# Patient Record
Sex: MALE | URBAN - METROPOLITAN AREA
[De-identification: names, ages, dates, MRNs, and addresses within clinical notes are randomized per-mention and may not be internally consistent; named-entity substitution may affect disease eponyms.]

---

## 2017-08-15 ENCOUNTER — EMERGENCY (EMERGENCY)
Facility: HOSPITAL | Age: 32
LOS: 1 days | Discharge: HOME | End: 2017-08-15
Attending: EMERGENCY MEDICINE | Admitting: EMERGENCY MEDICINE
Payer: COMMERCIAL

## 2017-08-15 VITALS
SYSTOLIC BLOOD PRESSURE: 130 MMHG | DIASTOLIC BLOOD PRESSURE: 69 MMHG | OXYGEN SATURATION: 100 % | TEMPERATURE: 98 F | HEART RATE: 63 BPM | RESPIRATION RATE: 17 BRPM

## 2017-08-15 VITALS
OXYGEN SATURATION: 100 % | HEART RATE: 83 BPM | RESPIRATION RATE: 18 BRPM | TEMPERATURE: 98 F | SYSTOLIC BLOOD PRESSURE: 102 MMHG | DIASTOLIC BLOOD PRESSURE: 80 MMHG

## 2017-08-15 PROCEDURE — 71020: CPT | Mod: 26

## 2017-08-15 PROCEDURE — 99284 EMERGENCY DEPT VISIT MOD MDM: CPT

## 2017-08-15 RX ORDER — KETOROLAC TROMETHAMINE 30 MG/ML
30 SYRINGE (ML) INJECTION ONCE
Qty: 0 | Refills: 0 | Status: DISCONTINUED | OUTPATIENT
Start: 2017-08-15 | End: 2017-08-15

## 2017-08-15 RX ADMIN — Medication 30 MILLIGRAM(S): at 15:48

## 2017-08-15 NOTE — ED PROVIDER NOTE - OBJECTIVE STATEMENT
31 y/o M w/ no significant PMHx, presents to the ED c/o HA and facial swelling x3 weeks. Pt states pain has now radiated to left shoulder since today. Pt was seen by PCP, dx w/ TMJ and told to follow up w/ Neuro. Pt has appt w/ neurologist, but it is at the end of the month. Pt has taken Excedrin w/ no relief. Denies fever, chills, rhinorrhea, cough, nausea, vomiting, diarrhea, dysuria or any other complaints. NKDA.

## 2017-08-15 NOTE — ED PROVIDER NOTE - MEDICAL DECISION MAKING DETAILS
33 y/o M w/ multiple medical complaints including HA, muscle aches, TMJ pain, tension HA w/ likely TMJ syndrome consider partitis although no swelling, discharge or erythema noted, advise NSAIDs, sour candies, close dental follow up as previously scheduled. Will obtain XR to eval for bony abnormalities, Toradol shot and anticipate dc home.

## 2017-08-15 NOTE — ED PROVIDER NOTE - ATTENDING CONTRIBUTION TO CARE
I performed the initial face to face bedside interview with this patient regarding history of present illness, review of symptoms and past medical, social and family history.  I completed an independent physical examination.  I was the initial provider who evaluated this patient.  The history, review of symptoms and examination was documented by the scribe in my presence and I attest to the accuracy of the documentation.  I have signed out the follow up of any pending tests (i.e. labs, radiological studies) to the resident.  I have discussed the patient’s plan of care and disposition with the resident.

## 2017-08-15 NOTE — ED PROVIDER NOTE - CARE PLAN
Principal Discharge DX:	TMJ (temporomandibular joint syndrome)  Secondary Diagnosis:	Tension headache

## 2017-08-15 NOTE — ED ADULT TRIAGE NOTE - CHIEF COMPLAINT QUOTE
pt c/o headache and facial swelling since yesterday.  pt taking excedrin for pain and it is not helping

## 2017-11-26 ENCOUNTER — EMERGENCY (EMERGENCY)
Facility: HOSPITAL | Age: 32
LOS: 1 days | Discharge: HOME | End: 2017-11-26
Attending: EMERGENCY MEDICINE | Admitting: EMERGENCY MEDICINE
Payer: COMMERCIAL

## 2017-11-26 VITALS
OXYGEN SATURATION: 98 % | HEIGHT: 71 IN | SYSTOLIC BLOOD PRESSURE: 126 MMHG | HEART RATE: 78 BPM | DIASTOLIC BLOOD PRESSURE: 78 MMHG | RESPIRATION RATE: 16 BRPM | TEMPERATURE: 98 F | WEIGHT: 210.1 LBS

## 2017-11-26 VITALS
RESPIRATION RATE: 17 BRPM | DIASTOLIC BLOOD PRESSURE: 87 MMHG | HEART RATE: 75 BPM | SYSTOLIC BLOOD PRESSURE: 124 MMHG | OXYGEN SATURATION: 98 % | TEMPERATURE: 98 F

## 2017-11-26 PROCEDURE — 99284 EMERGENCY DEPT VISIT MOD MDM: CPT | Mod: 25

## 2017-11-26 PROCEDURE — 99283 EMERGENCY DEPT VISIT LOW MDM: CPT | Mod: 25

## 2017-11-26 PROCEDURE — 93010 ELECTROCARDIOGRAM REPORT: CPT

## 2017-11-26 PROCEDURE — 93005 ELECTROCARDIOGRAM TRACING: CPT

## 2017-11-26 RX ORDER — IBUPROFEN 200 MG
600 TABLET ORAL ONCE
Qty: 0 | Refills: 0 | Status: COMPLETED | OUTPATIENT
Start: 2017-11-26 | End: 2017-11-26

## 2017-11-26 RX ADMIN — Medication 600 MILLIGRAM(S): at 15:27

## 2017-11-26 NOTE — ED PROVIDER NOTE - MEDICAL DECISION MAKING DETAILS
33yo male with chest tightness, normal EKG, no risk factors for PE, offered CXR but declined, will give motrin, refer to PMD for follow up. Nafisa Mann DO

## 2017-11-26 NOTE — ED ADULT NURSE NOTE - CHIEF COMPLAINT QUOTE
"chest pain, body aches, spasms for the past 3 months", shooting feeling down his arms, no numbness or tingling

## 2017-11-26 NOTE — ED PROVIDER NOTE - INTERPRETATION
normal sinus rhythm, Normal axis, Normal OH interval and QRS complex. There are no acute ischemic ST or T-wave changes. normal sinus rhythm

## 2017-11-26 NOTE — ED ADULT NURSE NOTE - OBJECTIVE STATEMENT
31 yo male presents to the ED c/o left sided chest pain, dull in nature, intermittent, non-radiating since 1100. patient states he woke up with the chest pain this AM. patient also c/o upper bilateral abdominal pain, sharp in nature. abdomen is soft, tender to touch upper quadrants bilaterally. patient states last BM was yesterday, constipated as per patient. patient also c/o muscle weakness and muscle spasms in neck, back, arms and legs bilaterally since august, worst over the last week. patient is AAOx3. lung sounds clear bilaterally. cap refill <3 sec. patient states he is to follow up with neurology in 3 weeks but patient states pain is getting worse. patient denies SOB, fevers, chills, N/V/D, HA, dizziness, cough, recent travel. JENNIE MURGUIA notified. NSR.

## 2017-11-26 NOTE — ED PROVIDER NOTE - PLAN OF CARE
1. Follow up with your primary care physician within 2-3days for reevaluation.  2.  Return to the Emergency Department for worsening, progressive or any other concerning symptoms.  3.  Please take Motrin 600mg by mouth every 6 hours as needed for pain. Please take this medication with food.

## 2017-11-26 NOTE — ED ADULT TRIAGE NOTE - CHIEF COMPLAINT QUOTE
"chest pain, body aches, spasms for the past 3 months", shooting feeling down his arms, no numbness or tingling "chest pain, body aches, spasms for the past 3 MONTHS", shooting feeling down his arms intermittent, no numbness or tingling, no radiation of pain

## 2017-11-26 NOTE — ED PROVIDER NOTE - PSH
No significant past surgical history No significant past surgical history    No significant past surgical history

## 2017-11-26 NOTE — ED PROVIDER NOTE - CARE PLAN
Principal Discharge DX:	Chest discomfort  Instructions for follow-up, activity and diet:	1. Follow up with your primary care physician within 2-3days for reevaluation.  2.  Return to the Emergency Department for worsening, progressive or any other concerning symptoms.  3.  Please take Motrin 600mg by mouth every 6 hours as needed for pain. Please take this medication with food.

## 2017-11-26 NOTE — ED PROVIDER NOTE - OBJECTIVE STATEMENT
31yo male PMH anxiety presenting with chest tightness x several months, no shortness of breath, seen by PMD several months ago and told he had high cholesterol but not given any medications. No fevers or chills, +dry cough x several months with negative CXR. No nausea or vomiting, no weakness. Patient states he has been treated for anxiety but recently discontinued his medication several days ago. Last used cocaine 4 months ago. +h/o herniated discs in neck and lumbar spine. No numbness or tingling, no weakness.     PMD: Dr. Goldberg

## 2017-11-26 NOTE — ED PROVIDER NOTE - PHYSICAL EXAMINATION
Gen: NAD, AOx3  Head: NCAT  Lung: CTAB, no respiratory distress, no wheezing, rales, rhonchi  CV: +TTP chest wall left sternal border, normal s1/s2, rrr, no murmurs, Normal perfusion, pulses 2+ throughout  Abd: soft, NTND, no CVA tenderness  MSK: No edema, no visible deformities, full range of motion in all 4 extremities  Neuro: CN II-XII grossly intact, No focal neurologic deficits  Skin: No rash   Psych: normal affect

## 2017-11-26 NOTE — ED PROVIDER NOTE - ATTENDING CONTRIBUTION TO CARE
I have seen and evaluated this patient with the resident.   I agree with the findings  unless other wise stated.  I have made appropriate changes in documentations where needed, After my face to face bedside evaluation, I am further  notinyo male with chest tightness, Intermmittent going on for months has seen doctors for same reason evaluated and told to have normal findings also h/o anxiety No cardiac or PE risk factors normal EKG, no risk factors for PE, offered CXR but declined, will give motrin,will be followed with PMD  --Acevedo

## 2018-04-02 PROBLEM — Z00.00 ENCOUNTER FOR PREVENTIVE HEALTH EXAMINATION: Status: ACTIVE | Noted: 2018-04-02

## 2018-05-01 ENCOUNTER — APPOINTMENT (OUTPATIENT)
Dept: NEUROLOGY | Facility: CLINIC | Age: 33
End: 2018-05-01
Payer: MEDICAID

## 2018-05-01 ENCOUNTER — TRANSCRIPTION ENCOUNTER (OUTPATIENT)
Age: 33
End: 2018-05-01

## 2018-05-01 VITALS
BODY MASS INDEX: 38.22 KG/M2 | DIASTOLIC BLOOD PRESSURE: 86 MMHG | WEIGHT: 267 LBS | HEART RATE: 76 BPM | OXYGEN SATURATION: 98 % | HEIGHT: 70 IN | SYSTOLIC BLOOD PRESSURE: 132 MMHG

## 2018-05-01 DIAGNOSIS — G72.9 MYOPATHY, UNSPECIFIED: ICD-10-CM

## 2018-05-01 DIAGNOSIS — Z83.3 FAMILY HISTORY OF DIABETES MELLITUS: ICD-10-CM

## 2018-05-01 DIAGNOSIS — Z86.39 PERSONAL HISTORY OF OTHER ENDOCRINE, NUTRITIONAL AND METABOLIC DISEASE: ICD-10-CM

## 2018-05-01 DIAGNOSIS — Z86.19 PERSONAL HISTORY OF OTHER INFECTIOUS AND PARASITIC DISEASES: ICD-10-CM

## 2018-05-01 DIAGNOSIS — F17.210 NICOTINE DEPENDENCE, CIGARETTES, UNCOMPLICATED: ICD-10-CM

## 2018-05-01 DIAGNOSIS — Z78.9 OTHER SPECIFIED HEALTH STATUS: ICD-10-CM

## 2018-05-01 PROCEDURE — 99205 OFFICE O/P NEW HI 60 MIN: CPT

## 2018-05-07 LAB — CK SERPL-CCNC: 389 U/L

## 2018-07-11 ENCOUNTER — APPOINTMENT (OUTPATIENT)
Dept: NEUROLOGY | Facility: CLINIC | Age: 33
End: 2018-07-11

## 2018-08-15 ENCOUNTER — APPOINTMENT (OUTPATIENT)
Dept: NEUROLOGY | Facility: CLINIC | Age: 33
End: 2018-08-15
Payer: MEDICAID

## 2018-08-15 VITALS
WEIGHT: 268 LBS | DIASTOLIC BLOOD PRESSURE: 88 MMHG | OXYGEN SATURATION: 98 % | HEIGHT: 70 IN | HEART RATE: 94 BPM | BODY MASS INDEX: 38.37 KG/M2 | SYSTOLIC BLOOD PRESSURE: 124 MMHG | TEMPERATURE: 97.7 F

## 2018-08-15 DIAGNOSIS — F17.200 NICOTINE DEPENDENCE, UNSPECIFIED, UNCOMPLICATED: ICD-10-CM

## 2018-08-15 DIAGNOSIS — Z82.0 FAMILY HISTORY OF EPILEPSY AND OTHER DISEASES OF THE NERVOUS SYSTEM: ICD-10-CM

## 2018-08-15 DIAGNOSIS — M62.81 MUSCLE WEAKNESS (GENERALIZED): ICD-10-CM

## 2018-08-15 PROBLEM — F41.9 ANXIETY DISORDER, UNSPECIFIED: Chronic | Status: ACTIVE | Noted: 2017-11-26

## 2018-08-15 PROCEDURE — 99215 OFFICE O/P EST HI 40 MIN: CPT

## 2018-08-15 RX ORDER — ERGOCALCIFEROL 1.25 MG/1
1.25 MG CAPSULE, LIQUID FILLED ORAL
Qty: 4 | Refills: 0 | Status: ACTIVE | COMMUNITY
Start: 2018-01-11

## 2018-09-08 ENCOUNTER — APPOINTMENT (OUTPATIENT)
Dept: MRI IMAGING | Facility: HOSPITAL | Age: 33
End: 2018-09-08
Payer: MEDICAID

## 2018-09-08 ENCOUNTER — OUTPATIENT (OUTPATIENT)
Dept: OUTPATIENT SERVICES | Facility: HOSPITAL | Age: 33
LOS: 1 days | End: 2018-09-08
Payer: COMMERCIAL

## 2018-09-08 DIAGNOSIS — M62.81 MUSCLE WEAKNESS (GENERALIZED): ICD-10-CM

## 2018-09-08 PROCEDURE — 70551 MRI BRAIN STEM W/O DYE: CPT | Mod: 26

## 2018-09-08 PROCEDURE — 72148 MRI LUMBAR SPINE W/O DYE: CPT | Mod: 26

## 2018-09-08 PROCEDURE — 72141 MRI NECK SPINE W/O DYE: CPT | Mod: 26

## 2018-09-08 PROCEDURE — 70551 MRI BRAIN STEM W/O DYE: CPT

## 2018-09-08 PROCEDURE — 72141 MRI NECK SPINE W/O DYE: CPT

## 2018-09-08 PROCEDURE — 72148 MRI LUMBAR SPINE W/O DYE: CPT

## 2018-11-28 ENCOUNTER — APPOINTMENT (OUTPATIENT)
Dept: NEUROLOGY | Facility: CLINIC | Age: 33
End: 2018-11-28
Payer: MEDICAID

## 2018-11-28 VITALS
DIASTOLIC BLOOD PRESSURE: 82 MMHG | WEIGHT: 278 LBS | SYSTOLIC BLOOD PRESSURE: 126 MMHG | TEMPERATURE: 98.1 F | OXYGEN SATURATION: 97 % | HEIGHT: 70 IN | HEART RATE: 97 BPM | BODY MASS INDEX: 39.8 KG/M2

## 2018-11-28 PROCEDURE — 95886 MUSC TEST DONE W/N TEST COMP: CPT | Mod: 50

## 2018-11-28 PROCEDURE — 95911 NRV CNDJ TEST 9-10 STUDIES: CPT

## 2018-11-30 ENCOUNTER — APPOINTMENT (OUTPATIENT)
Dept: NEUROLOGY | Facility: CLINIC | Age: 33
End: 2018-11-30
Payer: MEDICAID

## 2018-11-30 VITALS
OXYGEN SATURATION: 98 % | BODY MASS INDEX: 39.8 KG/M2 | HEART RATE: 85 BPM | SYSTOLIC BLOOD PRESSURE: 120 MMHG | TEMPERATURE: 98.1 F | HEIGHT: 70 IN | DIASTOLIC BLOOD PRESSURE: 82 MMHG | WEIGHT: 278 LBS

## 2018-11-30 PROCEDURE — 95886 MUSC TEST DONE W/N TEST COMP: CPT

## 2018-11-30 PROCEDURE — 95913 NRV CNDJ TEST 13/> STUDIES: CPT

## 2019-01-14 ENCOUNTER — APPOINTMENT (OUTPATIENT)
Dept: NEUROLOGY | Facility: CLINIC | Age: 34
End: 2019-01-14
Payer: MEDICAID

## 2019-01-14 VITALS
HEART RATE: 84 BPM | DIASTOLIC BLOOD PRESSURE: 71 MMHG | HEIGHT: 70 IN | OXYGEN SATURATION: 96 % | TEMPERATURE: 97.6 F | BODY MASS INDEX: 39.65 KG/M2 | SYSTOLIC BLOOD PRESSURE: 132 MMHG | WEIGHT: 277 LBS

## 2019-01-14 DIAGNOSIS — R22.0 LOCALIZED SWELLING, MASS AND LUMP, HEAD: ICD-10-CM

## 2019-01-14 DIAGNOSIS — M19.90 UNSPECIFIED OSTEOARTHRITIS, UNSPECIFIED SITE: ICD-10-CM

## 2019-01-14 DIAGNOSIS — M54.16 RADICULOPATHY, LUMBAR REGION: ICD-10-CM

## 2019-01-14 PROCEDURE — 99214 OFFICE O/P EST MOD 30 MIN: CPT

## 2019-01-14 RX ORDER — TIZANIDINE 4 MG/1
4 TABLET ORAL
Qty: 90 | Refills: 0 | Status: DISCONTINUED | COMMUNITY
Start: 2018-02-13 | End: 2019-01-14

## 2019-01-14 RX ORDER — GABAPENTIN 300 MG/1
300 CAPSULE ORAL
Qty: 90 | Refills: 2 | Status: ACTIVE | COMMUNITY
Start: 2019-01-14 | End: 1900-01-01

## 2019-01-14 NOTE — ASSESSMENT
[FreeTextEntry1] : The patient's work up is unremarkable for ALS, the patient has family history and has been advised to fu if there is new weakness or progression of symptoms.  Will continue to monitor him.  There are no multiple pain of muscle tenderness on exam, does not meet criteria for fibromyalgia.  No signs of myopathy of emg.\par \par The patient has back pain radiating to the legs with sensory loss to right L4, S1 distribution and nerve conduction/ EMG of the lack shows chronic L4-L5 radiculopathy bilaterally as well as imaging which shows small central and right-sided disc herniation and L3-L4 and L4-L5. As well a small central disc herniation at L5-S1.  Will recommend that the patient try PT again.  Neurontin for pain will titrate to 300mg tid.  \par \par Neck pain and diffuse joint pain\par will refer to rheum for eval of arthritis\par \par Insomnia\par will refer for sleep medicine

## 2019-01-14 NOTE — DATA REVIEWED
[de-identified] : (images reviwed): unremarkable brain [de-identified] : Nerve conduction/EMG of the arms is normal\par Nerve conduction/ EMG of the lack shows chronic L4-L5 radiculopathy bilaterally\par \par MRI C spine: \par IMPRESSION: \par Mild degenerative changes, mostly uncovertebral arthropathy. No significant \par central canal stenosis or neuroforaminal narrowing in the cervical spine. \par \par No cervical cord signal abnormality. \par MRI of the lumbar spine shows small central and right-sided disc herniation and L3-L4 and L4-L5. As well a small central disc herniation at L5-S1.

## 2019-01-14 NOTE — PHYSICAL EXAM
[General Appearance - Alert] : alert [General Appearance - In No Acute Distress] : in no acute distress [Person] : oriented to person [Place] : oriented to place [Time] : oriented to time [Registration Intact] : recent registration memory intact [Concentration Intact] : normal concentrating ability [Visual Intact] : visual attention was ~T not ~L decreased [Naming Objects] : no difficulty naming common objects [Repeating Phrases] : no difficulty repeating a phrase [Fluency] : fluency intact [Comprehension] : comprehension intact [Vocabulary] : adequate range of vocabulary [Cranial Nerves Optic (II)] : visual acuity intact bilaterally,  visual fields full to confrontation, pupils equal round and reactive to light [Cranial Nerves Oculomotor (III)] : extraocular motion intact [Cranial Nerves Trigeminal (V)] : facial sensation intact symmetrically [Cranial Nerves Facial (VII)] : face symmetrical [Motor Tone] : muscle tone was normal in all four extremities [Motor Strength] : muscle strength was normal in all four extremities [Abnormal Walk] : normal gait [Balance] : balance was intact [FreeTextEntry5] : facial mass on left maxilla [FreeTextEntry7] : sensory loss to right L4, S1 distribution

## 2019-01-14 NOTE — HISTORY OF PRESENT ILLNESS
[FreeTextEntry1] : The patient is a 33-year-old gentleman who is here for pain and weakness in bilateral hands, neck and back. Symptoms started about one year ago. He feels weak in the hands arms and legs, especially with fine motor skills. He also neck and back pain. He was previously diagnosed by cervical radiculopathy. He was given Tizanidine without relief of the pain, it was stopped recently. He notes twitching in his muscles for the past few months to year. He also feels stiffness in the arms and the legs. He feels that his symptoms have progressed over the past year. She denies difficulty with speaking, swallowing or breathing. There is no numbness or tingling in the arms or the legs. \par \par The patient has had elevated CPK. He does not exercise. He has gained significant weight over the past one year, he stopped working.  There is family history of ALS in patient's maternal uncle, who was diagnosed around age 40. \par \par He also feels stiffening of his face and was noted to have left sided facial mass, he was seen by head and neck surgeon.  The patient also has diffuse joint pain as well as difficulty with sleep.\par

## 2019-01-14 NOTE — REVIEW OF SYSTEMS
[As Noted in HPI] : as noted in HPI [Joint Pain] : joint pain [Muscle Weakness] : no muscle weakness

## 2019-03-19 ENCOUNTER — APPOINTMENT (OUTPATIENT)
Dept: RHEUMATOLOGY | Facility: CLINIC | Age: 34
End: 2019-03-19

## 2019-05-02 ENCOUNTER — TRANSCRIPTION ENCOUNTER (OUTPATIENT)
Age: 34
End: 2019-05-02

## 2019-05-02 ENCOUNTER — APPOINTMENT (OUTPATIENT)
Dept: NEUROLOGY | Facility: CLINIC | Age: 34
End: 2019-05-02
Payer: MEDICAID

## 2019-05-02 VITALS
OXYGEN SATURATION: 97 % | DIASTOLIC BLOOD PRESSURE: 83 MMHG | HEART RATE: 80 BPM | HEIGHT: 70 IN | SYSTOLIC BLOOD PRESSURE: 125 MMHG | BODY MASS INDEX: 38.65 KG/M2 | WEIGHT: 270 LBS

## 2019-05-02 DIAGNOSIS — G47.00 INSOMNIA, UNSPECIFIED: ICD-10-CM

## 2019-05-02 DIAGNOSIS — M54.12 RADICULOPATHY, CERVICAL REGION: ICD-10-CM

## 2019-05-02 PROCEDURE — 99213 OFFICE O/P EST LOW 20 MIN: CPT

## 2019-05-02 RX ORDER — GABAPENTIN 300 MG/1
300 CAPSULE ORAL 3 TIMES DAILY
Qty: 90 | Refills: 5 | Status: ACTIVE | COMMUNITY
Start: 2019-05-02 | End: 1900-01-01

## 2019-05-02 NOTE — ASSESSMENT
[FreeTextEntry1] : The patient's work up is unremarkable for ALS, the patient has family history and has been advised to fu if there is new weakness or progression of symptoms. Will continue to monitor him. There are no multiple pain of muscle tenderness on exam, does not meet criteria for fibromyalgia. No signs of myopathy of emg.\par \par The patient has back pain radiating to the legs with sensory loss to right L4, S1 distribution and nerve conduction/ EMG of the lack shows chronic L4-L5 radiculopathy bilaterally as well as imaging which shows small central and right-sided disc herniation and L3-L4 and L4-L5. As well a small central disc herniation at L5-S1. Will recommend that the patient try PT again. Neurontin for pain, 300mg tid. \par \par Neck pain and diffuse joint pain\par will refer to rheum for eval of arthritis\par \par Insomnia\par will refer for sleep medicine. \par

## 2019-05-02 NOTE — PHYSICAL EXAM
[General Appearance - Alert] : alert [General Appearance - In No Acute Distress] : in no acute distress [Person] : oriented to person [Concentration Intact] : normal concentrating ability [Place] : oriented to place [Repeating Phrases] : no difficulty repeating a phrase [Fluency] : fluency intact [Vocabulary] : adequate range of vocabulary [Cranial Nerves Optic (II)] : visual acuity intact bilaterally,  visual fields full to confrontation, pupils equal round and reactive to light [Cranial Nerves Oculomotor (III)] : extraocular motion intact [Cranial Nerves Trigeminal (V)] : facial sensation intact symmetrically [Motor Tone] : muscle tone was normal in all four extremities [Cranial Nerves Facial (VII)] : face symmetrical [Sensation Tactile Decrease] : light touch was intact [Motor Strength] : muscle strength was normal in all four extremities [Involuntary Movements] : no involuntary movements were seen [Abnormal Walk] : normal gait [Balance] : balance was intact [Coordination - Dysmetria Impaired Heel-to-Shin Bilateral] : not present [Coordination - Dysmetria Impaired Finger-to-Nose Bilateral] : not present

## 2020-01-03 ENCOUNTER — APPOINTMENT (OUTPATIENT)
Dept: NEUROLOGY | Facility: CLINIC | Age: 35
End: 2020-01-03

## 2023-10-23 NOTE — ED PROVIDER NOTE - CARDIAC MURMUR
"Note sent to RN June: \"Looks like patient did not get a B12 injection last week but may be on the schedule at [place] today.  Please make sure he gets his B12 shot today and monthly.\"  " no murmur

## 2025-02-08 NOTE — HISTORY OF PRESENT ILLNESS
1 [FreeTextEntry1] : The patient is a 33-year-old gentleman who is here for pain and weakness in bilateral hands, neck and back. Symptoms started about one year ago. He feels weak in the hands arms and legs, especially with fine motor skills. He also neck and back pain. He was previously diagnosed by cervical radiculopathy. He was given Tizanidine without relief of the pain, it was stopped recently. He notes twitching in his muscles for the past few months to year. He also feels stiffness in the arms and the legs. He feels that his symptoms have progressed over the past year. She denies difficulty with speaking, swallowing or breathing. There is no numbness or tingling in the arms or the legs. \par \par The patient has had elevated CPK. He does not exercise. He has gained significant weight over the past one year, he stopped working. There is family history of ALS in patient's maternal uncle, who was diagnosed around age 40. \par \par He also feels stiffening of his face and was noted to have left sided facial mass, he was seen by head and neck surgeon. The patient also has diffuse joint pain as well as difficulty with sleep.  He was started on Neurontin but did not titrate it appropriately and did not give it a chance to work.\par